# Patient Record
Sex: FEMALE
[De-identification: names, ages, dates, MRNs, and addresses within clinical notes are randomized per-mention and may not be internally consistent; named-entity substitution may affect disease eponyms.]

---

## 2021-05-18 ENCOUNTER — HOSPITAL ENCOUNTER (EMERGENCY)
Dept: HOSPITAL 90 - EDH | Age: 51
Discharge: HOME | End: 2021-05-18
Payer: MEDICAID

## 2021-05-18 DIAGNOSIS — F43.20: ICD-10-CM

## 2021-05-18 DIAGNOSIS — F41.9: ICD-10-CM

## 2021-05-18 DIAGNOSIS — F32.9: ICD-10-CM

## 2021-05-18 DIAGNOSIS — Z88.6: ICD-10-CM

## 2021-05-18 DIAGNOSIS — E11.9: ICD-10-CM

## 2021-05-18 DIAGNOSIS — R07.89: Primary | ICD-10-CM

## 2021-05-18 DIAGNOSIS — I10: ICD-10-CM

## 2021-05-18 DIAGNOSIS — Z88.1: ICD-10-CM

## 2021-05-18 LAB
ALBUMIN SERPL-MCNC: 3.3 G/DL (ref 3.5–5)
ALT SERPL-CCNC: 15 U/L (ref 12–78)
AST SERPL-CCNC: 31 U/L (ref 10–37)
BASOPHILS NFR BLD AUTO: 0.4 % (ref 0–5)
BILIRUB SERPL-MCNC: 0.4 MG/DL (ref 0.2–1)
BUN SERPL-MCNC: 12 MG/DL (ref 7–18)
CHLORIDE SERPL-SCNC: 106 MMOL/L (ref 101–111)
CO2 SERPL-SCNC: 26 MMOL/L (ref 21–32)
CREAT SERPL-MCNC: 0.8 MG/DL (ref 0.5–1.5)
EOSINOPHIL NFR BLD AUTO: 1.7 % (ref 0–8)
ERYTHROCYTE [DISTWIDTH] IN BLOOD BY AUTOMATED COUNT: 12.7 % (ref 11–15.5)
GFR SERPL CREATININE-BSD FRML MDRD: 80 ML/MIN (ref 60–?)
GLUCOSE SERPL-MCNC: 133 MG/DL (ref 70–105)
HCT VFR BLD AUTO: 37.1 % (ref 36–48)
LYMPHOCYTES NFR SPEC AUTO: 30.9 % (ref 21–51)
MCH RBC QN AUTO: 29.2 PG (ref 27–33)
MCHC RBC AUTO-ENTMCNC: 32.3 G/DL (ref 32–36)
MCV RBC AUTO: 90.3 FL (ref 79–99)
MONOCYTES NFR BLD AUTO: 6.2 % (ref 3–13)
NEUTROPHILS NFR BLD AUTO: 60.5 % (ref 40–77)
NRBC BLD MANUAL-RTO: 0 % (ref 0–0.19)
PLATELET # BLD AUTO: 278 K/UL (ref 130–400)
POTASSIUM SERPL-SCNC: 5.2 MMOL/L (ref 3.5–5.1)
PROT SERPL-MCNC: 7.7 G/DL (ref 6–8.3)
RBC # BLD AUTO: 4.11 MIL/UL (ref 4–5.5)
SODIUM SERPL-SCNC: 142 MMOL/L (ref 136–145)
WBC # BLD AUTO: 7.7 K/UL (ref 4.8–10.8)

## 2021-05-18 PROCEDURE — 84484 ASSAY OF TROPONIN QUANT: CPT

## 2021-05-18 PROCEDURE — 71045 X-RAY EXAM CHEST 1 VIEW: CPT

## 2021-05-18 PROCEDURE — 96374 THER/PROPH/DIAG INJ IV PUSH: CPT

## 2021-05-18 PROCEDURE — 99285 EMERGENCY DEPT VISIT HI MDM: CPT

## 2021-05-18 PROCEDURE — 93005 ELECTROCARDIOGRAM TRACING: CPT

## 2021-05-18 PROCEDURE — 96361 HYDRATE IV INFUSION ADD-ON: CPT

## 2021-05-18 PROCEDURE — 80053 COMPREHEN METABOLIC PANEL: CPT

## 2021-05-18 PROCEDURE — 85025 COMPLETE CBC W/AUTO DIFF WBC: CPT

## 2021-05-18 PROCEDURE — 96375 TX/PRO/DX INJ NEW DRUG ADDON: CPT

## 2021-05-18 PROCEDURE — 36415 COLL VENOUS BLD VENIPUNCTURE: CPT

## 2021-09-28 ENCOUNTER — HOSPITAL ENCOUNTER (OUTPATIENT)
Dept: HOSPITAL 90 - RAH | Age: 51
Discharge: HOME | End: 2021-09-28
Attending: OTOLARYNGOLOGY
Payer: MEDICAID

## 2021-09-28 DIAGNOSIS — H90.42: Primary | ICD-10-CM

## 2021-09-28 PROCEDURE — 70553 MRI BRAIN STEM W/O & W/DYE: CPT

## 2022-03-06 ENCOUNTER — HOSPITAL ENCOUNTER (EMERGENCY)
Dept: HOSPITAL 90 - EDH | Age: 52
Discharge: HOME | End: 2022-03-06
Payer: MEDICAID

## 2022-03-06 VITALS — WEIGHT: 138.01 LBS | HEIGHT: 59 IN | BODY MASS INDEX: 27.82 KG/M2

## 2022-03-06 VITALS — DIASTOLIC BLOOD PRESSURE: 67 MMHG | SYSTOLIC BLOOD PRESSURE: 112 MMHG

## 2022-03-06 DIAGNOSIS — R07.89: ICD-10-CM

## 2022-03-06 DIAGNOSIS — Z98.890: ICD-10-CM

## 2022-03-06 DIAGNOSIS — Z88.1: ICD-10-CM

## 2022-03-06 DIAGNOSIS — E11.9: ICD-10-CM

## 2022-03-06 DIAGNOSIS — I10: ICD-10-CM

## 2022-03-06 DIAGNOSIS — Z79.899: ICD-10-CM

## 2022-03-06 DIAGNOSIS — F43.9: Primary | ICD-10-CM

## 2022-03-06 DIAGNOSIS — Z88.6: ICD-10-CM

## 2022-03-06 LAB
ALBUMIN SERPL-MCNC: 3.6 G/DL (ref 3.5–5)
ALT SERPL-CCNC: 23 U/L (ref 12–78)
AST SERPL-CCNC: 16 U/L (ref 10–37)
BASOPHILS NFR BLD AUTO: 0.3 % (ref 0–5)
BILIRUB SERPL-MCNC: 0.4 MG/DL (ref 0.2–1)
BUN SERPL-MCNC: 19 MG/DL (ref 7–18)
CHLORIDE SERPL-SCNC: 104 MMOL/L (ref 101–111)
CO2 SERPL-SCNC: 28 MMOL/L (ref 21–32)
CREAT SERPL-MCNC: 0.8 MG/DL (ref 0.5–1.5)
EOSINOPHIL NFR BLD AUTO: 1 % (ref 0–8)
ERYTHROCYTE [DISTWIDTH] IN BLOOD BY AUTOMATED COUNT: 12.7 % (ref 11–15.5)
GFR SERPL CREATININE-BSD FRML MDRD: 80 ML/MIN (ref 60–?)
GLUCOSE SERPL-MCNC: 115 MG/DL (ref 70–105)
HCT VFR BLD AUTO: 38.1 % (ref 36–48)
LYMPHOCYTES NFR SPEC AUTO: 40.5 % (ref 21–51)
MCH RBC QN AUTO: 29.1 PG (ref 27–33)
MCHC RBC AUTO-ENTMCNC: 32 G/DL (ref 32–36)
MCV RBC AUTO: 90.9 FL (ref 79–99)
MONOCYTES NFR BLD AUTO: 7.1 % (ref 3–13)
NEUTROPHILS NFR BLD AUTO: 50.9 % (ref 40–77)
NRBC BLD MANUAL-RTO: 0 % (ref 0–0.19)
PLATELET # BLD AUTO: 319 K/UL (ref 130–400)
POTASSIUM SERPL-SCNC: 3.9 MMOL/L (ref 3.5–5.1)
PROT SERPL-MCNC: 7.7 G/DL (ref 6–8.3)
RBC # BLD AUTO: 4.19 MIL/UL (ref 4–5.5)
SODIUM SERPL-SCNC: 140 MMOL/L (ref 136–145)
WBC # BLD AUTO: 8.8 K/UL (ref 4.8–10.8)

## 2022-03-06 PROCEDURE — 84484 ASSAY OF TROPONIN QUANT: CPT

## 2022-03-06 PROCEDURE — 93005 ELECTROCARDIOGRAM TRACING: CPT

## 2022-03-06 PROCEDURE — 36415 COLL VENOUS BLD VENIPUNCTURE: CPT

## 2022-03-06 PROCEDURE — 85025 COMPLETE CBC W/AUTO DIFF WBC: CPT

## 2022-03-06 PROCEDURE — 83690 ASSAY OF LIPASE: CPT

## 2022-03-06 PROCEDURE — 71045 X-RAY EXAM CHEST 1 VIEW: CPT

## 2022-03-06 PROCEDURE — 80053 COMPREHEN METABOLIC PANEL: CPT

## 2022-04-25 ENCOUNTER — HOSPITAL ENCOUNTER (EMERGENCY)
Dept: HOSPITAL 90 - EDH | Age: 52
Discharge: HOME | End: 2022-04-25
Payer: MEDICAID

## 2022-04-25 VITALS — DIASTOLIC BLOOD PRESSURE: 86 MMHG | SYSTOLIC BLOOD PRESSURE: 134 MMHG

## 2022-04-25 DIAGNOSIS — Z79.899: ICD-10-CM

## 2022-04-25 DIAGNOSIS — R07.89: Primary | ICD-10-CM

## 2022-04-25 DIAGNOSIS — Z88.6: ICD-10-CM

## 2022-04-25 DIAGNOSIS — I10: ICD-10-CM

## 2022-04-25 DIAGNOSIS — Z98.890: ICD-10-CM

## 2022-04-25 DIAGNOSIS — E11.9: ICD-10-CM

## 2022-04-25 DIAGNOSIS — K21.9: ICD-10-CM

## 2022-04-25 DIAGNOSIS — Z88.1: ICD-10-CM

## 2022-04-25 LAB
ALBUMIN SERPL-MCNC: 3.6 G/DL (ref 3.5–5)
ALT SERPL-CCNC: 19 U/L (ref 12–78)
AMPHETAMINES UR QL SCN: NEGATIVE
AST SERPL-CCNC: 17 U/L (ref 10–37)
BARBITURATES UR QL SCN: NEGATIVE
BASOPHILS NFR BLD AUTO: 0.4 % (ref 0–5)
BENZODIAZ UR QL SCN: NEGATIVE
BILIRUB SERPL-MCNC: 0.3 MG/DL (ref 0.2–1)
BNP SERPL-MCNC: 28 PG/ML (ref 0–100)
BUN SERPL-MCNC: 19 MG/DL (ref 7–18)
BZE UR QL SCN: NEGATIVE
CANNABINOIDS UR QL SCN: NEGATIVE
CHLORIDE SERPL-SCNC: 104 MMOL/L (ref 101–111)
CO2 SERPL-SCNC: 26 MMOL/L (ref 21–32)
CREAT SERPL-MCNC: 0.9 MG/DL (ref 0.5–1.5)
EOSINOPHIL NFR BLD AUTO: 0.9 % (ref 0–8)
ERYTHROCYTE [DISTWIDTH] IN BLOOD BY AUTOMATED COUNT: 13.6 % (ref 11–15.5)
GFR SERPL CREATININE-BSD FRML MDRD: 70 ML/MIN (ref 60–?)
GLUCOSE SERPL-MCNC: 106 MG/DL (ref 70–105)
HCT VFR BLD AUTO: 36.9 % (ref 36–48)
INR PPP: 0.98 (ref 0.85–1.15)
LYMPHOCYTES NFR SPEC AUTO: 21.9 % (ref 21–51)
MCH RBC QN AUTO: 29.5 PG (ref 27–33)
MCHC RBC AUTO-ENTMCNC: 32.8 G/DL (ref 32–36)
MCV RBC AUTO: 90 FL (ref 79–99)
MONOCYTES NFR BLD AUTO: 6.6 % (ref 3–13)
NEUTROPHILS NFR BLD AUTO: 70 % (ref 40–77)
NRBC BLD MANUAL-RTO: 0 % (ref 0–0.19)
OPIATES UR QL SCN: NEGATIVE
PCP UR QL SCN: NEGATIVE
PLATELET # BLD AUTO: 256 K/UL (ref 130–400)
POTASSIUM SERPL-SCNC: 4 MMOL/L (ref 3.5–5.1)
PROT SERPL-MCNC: 7.7 G/DL (ref 6–8.3)
PROTHROMBIN TIME: 10.7 SEC (ref 9.6–11.6)
RBC # BLD AUTO: 4.1 MIL/UL (ref 4–5.5)
SODIUM SERPL-SCNC: 140 MMOL/L (ref 136–145)
WBC # BLD AUTO: 8.5 K/UL (ref 4.8–10.8)

## 2022-04-25 PROCEDURE — 36415 COLL VENOUS BLD VENIPUNCTURE: CPT

## 2022-04-25 PROCEDURE — 80053 COMPREHEN METABOLIC PANEL: CPT

## 2022-04-25 PROCEDURE — 71045 X-RAY EXAM CHEST 1 VIEW: CPT

## 2022-04-25 PROCEDURE — 80305 DRUG TEST PRSMV DIR OPT OBS: CPT

## 2022-04-25 PROCEDURE — 84484 ASSAY OF TROPONIN QUANT: CPT

## 2022-04-25 PROCEDURE — 85025 COMPLETE CBC W/AUTO DIFF WBC: CPT

## 2022-04-25 PROCEDURE — 93005 ELECTROCARDIOGRAM TRACING: CPT

## 2022-04-25 PROCEDURE — 83880 ASSAY OF NATRIURETIC PEPTIDE: CPT

## 2022-04-25 PROCEDURE — 85610 PROTHROMBIN TIME: CPT

## 2023-08-27 ENCOUNTER — HOSPITAL ENCOUNTER (EMERGENCY)
Dept: HOSPITAL 90 - EDH | Age: 53
LOS: 1 days | Discharge: HOME | End: 2023-08-28
Payer: MEDICAID

## 2023-08-27 VITALS — WEIGHT: 145.95 LBS | HEIGHT: 59 IN | BODY MASS INDEX: 29.42 KG/M2

## 2023-08-27 DIAGNOSIS — E11.9: ICD-10-CM

## 2023-08-27 DIAGNOSIS — I10: ICD-10-CM

## 2023-08-27 DIAGNOSIS — E78.00: ICD-10-CM

## 2023-08-27 DIAGNOSIS — Z91.148: ICD-10-CM

## 2023-08-27 DIAGNOSIS — Z88.1: ICD-10-CM

## 2023-08-27 DIAGNOSIS — R07.89: Primary | ICD-10-CM

## 2023-08-27 DIAGNOSIS — Z88.6: ICD-10-CM

## 2023-08-27 PROCEDURE — 85025 COMPLETE CBC W/AUTO DIFF WBC: CPT

## 2023-08-27 PROCEDURE — 93005 ELECTROCARDIOGRAM TRACING: CPT

## 2023-08-27 PROCEDURE — 81001 URINALYSIS AUTO W/SCOPE: CPT

## 2023-08-27 PROCEDURE — 83880 ASSAY OF NATRIURETIC PEPTIDE: CPT

## 2023-08-27 PROCEDURE — 80053 COMPREHEN METABOLIC PANEL: CPT

## 2023-08-27 PROCEDURE — 85730 THROMBOPLASTIN TIME PARTIAL: CPT

## 2023-08-27 PROCEDURE — 99285 EMERGENCY DEPT VISIT HI MDM: CPT

## 2023-08-27 PROCEDURE — 84484 ASSAY OF TROPONIN QUANT: CPT

## 2023-08-27 PROCEDURE — 82550 ASSAY OF CK (CPK): CPT

## 2023-08-27 PROCEDURE — 87088 URINE BACTERIA CULTURE: CPT

## 2023-08-27 PROCEDURE — 36415 COLL VENOUS BLD VENIPUNCTURE: CPT

## 2023-08-27 PROCEDURE — 85378 FIBRIN DEGRADE SEMIQUANT: CPT

## 2023-08-27 PROCEDURE — 71270 CT THORAX DX C-/C+: CPT

## 2023-08-27 PROCEDURE — 85610 PROTHROMBIN TIME: CPT

## 2023-08-27 PROCEDURE — 71045 X-RAY EXAM CHEST 1 VIEW: CPT

## 2023-08-28 VITALS
SYSTOLIC BLOOD PRESSURE: 103 MMHG | OXYGEN SATURATION: 100 % | HEART RATE: 77 BPM | DIASTOLIC BLOOD PRESSURE: 66 MMHG | RESPIRATION RATE: 18 BRPM

## 2023-08-28 LAB
ALBUMIN SERPL-MCNC: 3.6 G/DL (ref 3.5–5)
ALT SERPL-CCNC: 30 U/L (ref 12–78)
APPEARANCE UR: (no result)
APTT PPP: 27.8 SEC (ref 26.3–35.5)
AST SERPL-CCNC: 20 U/L (ref 10–37)
BACTERIA URNS QL MICRO: (no result) /HPF
BASOPHILS # BLD AUTO: 0.04 K/UL (ref 0–0.2)
BASOPHILS NFR BLD AUTO: 0.5 % (ref 0–5)
BILIRUB SERPL-MCNC: 0.2 MG/DL (ref 0.2–1)
BILIRUB UR QL STRIP: NEGATIVE MG/DL
BNP SERPL-MCNC: 9 PG/ML (ref 0–100)
BUN SERPL-MCNC: 18 MG/DL (ref 7–18)
CHLORIDE SERPL-SCNC: 99 MMOL/L (ref 101–111)
CK SERPL-CCNC: 107 U/L (ref 21–232)
CO2 SERPL-SCNC: 30 MMOL/L (ref 21–32)
COLOR UR: (no result)
CREAT SERPL-MCNC: 0.8 MG/DL (ref 0.5–1.5)
DEPRECATED SQUAMOUS URNS QL MICRO: (no result) /HPF (ref 0–2)
EOSINOPHIL # BLD AUTO: 0.13 K/UL (ref 0–0.7)
EOSINOPHIL NFR BLD AUTO: 1.5 % (ref 0–8)
ERYTHROCYTE [DISTWIDTH] IN BLOOD BY AUTOMATED COUNT: 12.2 % (ref 11–15.5)
GFR SERPL CREATININE-BSD FRML MDRD: 88 ML/MIN (ref 90–?)
GLUCOSE SERPL-MCNC: 126 MG/DL (ref 70–105)
GLUCOSE UR STRIP-MCNC: NEGATIVE MG/DL
HCT VFR BLD AUTO: 39.6 % (ref 36–48)
HGB UR QL STRIP: NEGATIVE
IMM GRANULOCYTES # BLD: 0.03 K/UL (ref 0–1)
INR PPP: < 0.93 (ref 0.85–1.15)
KETONES UR STRIP-MCNC: NEGATIVE MG/DL
LEUKOCYTE ESTERASE UR QL STRIP: 250 LEU/UL
LYMPHOCYTES # SPEC AUTO: 3.1 K/UL (ref 1–4.8)
LYMPHOCYTES NFR SPEC AUTO: 35.4 % (ref 21–51)
MCH RBC QN AUTO: 29.3 PG (ref 27–33)
MCHC RBC AUTO-ENTMCNC: 32.6 G/DL (ref 32–36)
MCV RBC AUTO: 89.8 FL (ref 79–99)
MICRO URNS: YES
MONOCYTES # BLD AUTO: 0.6 K/UL (ref 0.1–1)
MONOCYTES NFR BLD AUTO: 6.8 % (ref 3–13)
MUCOUS THREADS URNS QL MICRO: (no result) LPF
NEUTROPHILS # BLD AUTO: 4.8 K/UL (ref 1.8–7.7)
NEUTROPHILS NFR BLD AUTO: 55.5 % (ref 40–77)
NITRITE UR QL STRIP: NEGATIVE
NRBC BLD MANUAL-RTO: 0 % (ref 0–0.19)
PH UR STRIP: 6 [PH] (ref 5–8)
PLATELET # BLD AUTO: 322 K/UL (ref 130–400)
POTASSIUM SERPL-SCNC: 3.8 MMOL/L (ref 3.5–5.1)
PROT SERPL-MCNC: 7.7 G/DL (ref 6–8.3)
PROT UR QL STRIP: NEGATIVE MG/DL
PROTHROMBIN TIME: 10.3 SEC (ref 9.6–11.6)
RBC # BLD AUTO: 4.41 MIL/UL (ref 4–5.5)
RBC #/AREA URNS HPF: (no result) /HPF (ref 0–1)
SODIUM SERPL-SCNC: 134 MMOL/L (ref 136–145)
SP GR UR STRIP: 1.02 (ref 1–1.03)
UROBILINOGEN UR STRIP-MCNC: 0.2 MG/DL (ref 0.2–1)
WBC # BLD AUTO: 8.7 K/UL (ref 4.8–10.8)
WBC #/AREA URNS HPF: (no result) /HPF (ref 0–1)

## 2023-10-12 ENCOUNTER — HOSPITAL ENCOUNTER (EMERGENCY)
Dept: HOSPITAL 90 - EDH | Age: 53
Discharge: HOME | End: 2023-10-12
Payer: MEDICAID

## 2023-10-12 VITALS — DIASTOLIC BLOOD PRESSURE: 68 MMHG | HEART RATE: 90 BPM | RESPIRATION RATE: 18 BRPM | SYSTOLIC BLOOD PRESSURE: 122 MMHG

## 2023-10-12 VITALS — BODY MASS INDEX: 27.48 KG/M2 | WEIGHT: 139.99 LBS | HEIGHT: 60 IN

## 2023-10-12 DIAGNOSIS — F41.9: Primary | ICD-10-CM

## 2023-10-12 PROCEDURE — 99281 EMR DPT VST MAYX REQ PHY/QHP: CPT

## 2024-10-30 ENCOUNTER — HOSPITAL ENCOUNTER (EMERGENCY)
Dept: HOSPITAL 90 - EDH | Age: 54
LOS: 1 days | Discharge: HOME | End: 2024-10-31
Payer: MEDICAID

## 2024-10-30 VITALS — WEIGHT: 160.06 LBS | BODY MASS INDEX: 31.42 KG/M2 | HEIGHT: 60 IN

## 2024-10-30 DIAGNOSIS — Z95.5: ICD-10-CM

## 2024-10-30 DIAGNOSIS — Z88.1: ICD-10-CM

## 2024-10-30 DIAGNOSIS — F43.9: ICD-10-CM

## 2024-10-30 DIAGNOSIS — E87.6: ICD-10-CM

## 2024-10-30 DIAGNOSIS — R07.89: Primary | ICD-10-CM

## 2024-10-30 DIAGNOSIS — E78.00: ICD-10-CM

## 2024-10-30 DIAGNOSIS — E11.9: ICD-10-CM

## 2024-10-30 DIAGNOSIS — Z79.890: ICD-10-CM

## 2024-10-30 DIAGNOSIS — Z88.6: ICD-10-CM

## 2024-10-30 DIAGNOSIS — F41.9: ICD-10-CM

## 2024-10-30 DIAGNOSIS — I10: ICD-10-CM

## 2024-10-30 LAB
BASOPHILS # BLD AUTO: 0.02 K/UL (ref 0–0.2)
BASOPHILS NFR BLD AUTO: 0.2 % (ref 0–5)
BUN SERPL-MCNC: 15 MG/DL (ref 7–18)
CHLORIDE SERPL-SCNC: 100 MMOL/L (ref 101–111)
CK SERPL-CCNC: 223 U/L (ref 21–232)
CO2 SERPL-SCNC: 29 MMOL/L (ref 21–32)
CREAT SERPL-MCNC: 0.9 MG/DL (ref 0.5–1)
EOSINOPHIL # BLD AUTO: 0.01 K/UL (ref 0–0.7)
EOSINOPHIL NFR BLD AUTO: 0.1 % (ref 0–8)
ERYTHROCYTE [DISTWIDTH] IN BLOOD BY AUTOMATED COUNT: 12.6 % (ref 11–15.5)
GFR SERPL CREATININE-BSD FRML MDRD: 76 ML/MIN (ref 90–?)
GLUCOSE SERPL-MCNC: 156 MG/DL (ref 70–105)
HCT VFR BLD AUTO: 39.6 % (ref 36–48)
IMM GRANULOCYTES # BLD: 0.02 K/UL (ref 0–1)
LYMPHOCYTES # SPEC AUTO: 2.5 K/UL (ref 1–4.8)
LYMPHOCYTES NFR SPEC AUTO: 23.3 % (ref 21–51)
MCH RBC QN AUTO: 29 PG (ref 27–33)
MCHC RBC AUTO-ENTMCNC: 32.3 G/DL (ref 32–36)
MCV RBC AUTO: 89.6 FL (ref 79–99)
MONOCYTES # BLD AUTO: 0.6 K/UL (ref 0.1–1)
MONOCYTES NFR BLD AUTO: 5.7 % (ref 3–13)
NEUTROPHILS # BLD AUTO: 7.4 K/UL (ref 1.8–7.7)
NEUTROPHILS NFR BLD AUTO: 70.5 % (ref 40–77)
NRBC BLD MANUAL-RTO: 0 % (ref 0–0.19)
PLATELET # BLD AUTO: 300 K/UL (ref 130–400)
POTASSIUM SERPL-SCNC: 3.3 MMOL/L (ref 3.5–5.1)
RBC # BLD AUTO: 4.42 MIL/UL (ref 4–5.5)
SODIUM SERPL-SCNC: 137 MMOL/L (ref 136–145)
WBC # BLD AUTO: 10.5 K/UL (ref 4.8–10.8)

## 2024-10-30 PROCEDURE — 93005 ELECTROCARDIOGRAM TRACING: CPT

## 2024-10-30 PROCEDURE — 80048 BASIC METABOLIC PNL TOTAL CA: CPT

## 2024-10-30 PROCEDURE — 71045 X-RAY EXAM CHEST 1 VIEW: CPT

## 2024-10-30 PROCEDURE — 36415 COLL VENOUS BLD VENIPUNCTURE: CPT

## 2024-10-30 PROCEDURE — 84484 ASSAY OF TROPONIN QUANT: CPT

## 2024-10-30 PROCEDURE — 85025 COMPLETE CBC W/AUTO DIFF WBC: CPT

## 2024-10-30 PROCEDURE — 82550 ASSAY OF CK (CPK): CPT

## 2024-10-30 PROCEDURE — 83880 ASSAY OF NATRIURETIC PEPTIDE: CPT

## 2024-10-31 VITALS
TEMPERATURE: 97.9 F | OXYGEN SATURATION: 97 % | SYSTOLIC BLOOD PRESSURE: 126 MMHG | HEART RATE: 95 BPM | DIASTOLIC BLOOD PRESSURE: 77 MMHG | RESPIRATION RATE: 18 BRPM

## 2024-10-31 LAB — BNP SERPL-MCNC: 14 PG/ML (ref 0–100)

## 2024-10-31 NOTE — ERN
ED Note


History of Present Illness


Stated Complaint:  CHEST PAIN


Chief Complaint:  Chest Pain


Time Seen by MD:  23:01


Time Seen by Midlevel:  23:01


Dictation:


The patient is a 54-year-old female with a history of diabetes, hypertension, 

cholecystectomy who presents to the emergency department with complaints of mid 

chest pain onset1 hour prior to arrival after having an argument.  Patient 

denies any sweating, shortness of breath, nausea or vomiting.  Patient reports 

pain is worse with movement.


Allergies:  


Coded Allergies:  


     acetaminophen (Unverified  Allergy, Unknown, 9/24/23)


     ciprofloxacin (Unverified  Allergy, Unknown, 5/18/21)


     ibuprofen (Unverified  Allergy, Unknown, 5/18/21)


Home Meds


Active Scripts


Cefdinir (Cefdinir) 300 Mg Capsule, 300 MG PO BID for 5 Days, #10 CAP


   Prov:KIERA EVANS NP         2/20/24


Reported Medications


Hydroxyzine HCl (Hydroxyzine HCl) 25 Mg Tablet, 25 MG PO BID, TAB


   2/18/24


Levothyroxine Sodium (Synthroid  50 Mcg Tab) 50 Mcg Tablet, 50 MCG PO DAILY, #1 

TAB


   2/18/24





Past Medical History


Past Medical History:  Anxiety, Diabetes-Type II, High Cholesterol, Heart 

Disease, Hypertension, Hyperthyroid


Additional Past Medical Hx:  GASTRITIS


Surgical History:  None


Surgical History Other:  CARDIAC STENTS


Family History:  DM, HTN


Social History:  Negative, Lives with family


Pregnancy History:  Not Applicable


RN Note Reviewed/Agreed w/PFSH:  Yes





Review of System


Dictation


Constitutional: Negative for fever,chills, and weight loss


Eyes: Negative for injury, pain,redness, and discharge


ENT: Negative for injury,pain or swelling


Cardiovascular: Negative for  palpitations, and edema.  Positive for chest pain


Respiratory: Negative for shortness of breath, cough, and wheezing, 


Abdomen/GI: Negative for abdominal pain, nausea, vomiting, diarrhea, and 

constipation


Back: Negative for injury and pain


: Negative for injury, bleeding and discharge


MS/Extremity: Negative for injury and deformity


Skin: Negative for rash, and discoloration


Neuro: Negative for headache, weakness, numbness, tingling, and seizure 


Psych: Negative for suicide ideation, homicidal ideation, and hallucinations





Initial Vital Sign


VS





                                   Vital Signs








  Date Time  Temp Pulse Resp B/P (MAP) Pulse Ox O2 Delivery O2 Flow Rate FiO2


 


10/30/24 22:49 98.2 120 20 127/77 97 Room Air 0 


 


10/30/24 23:10        21











Physical Exam


Dictation


Vital Signs reviewed 


General Appearance: Alert, oriented x 3, no acute distress, well developed, 

nourished. 


Head and Face: non-traumatic.


Eyes: PERRL, pink conjunctivas, eyelid no trauma, anterior chamber with arcus 

senilis. 


Ears: Pinnas intact and no signs of trauma or erythema ear canals clear and no 

discharge TM no erythema 


Nose: No discharge, no bleeding. 


Oropharynx: Mouth normal, tongue pink.


pharynx clear,no erythema, tonsils no exudates, no abscesses noted,  mucous 

membrane moist 


Neck: Supple, non-tender, no thyromegaly, no masses, no JVD, no bruits 


Breast:Deferred 


Chest: tenderness, no crepitus, no paradoxical movement, no retractions 


Lungs:Clear, well-ventilated, symmetric, no rales, no wheezing, no rhonchi, no 

stridor, good breath sounds bilaterally 


Heart: Regular rate, regular rhythm, no murmur, no gallops 


Vascular: no peripheral edema, 


Abdomen: Soft, positive bowel sounds, nondistended, no guarding, 


nontender, no rebound, no masses no hepatomegaly, no splenomegaly, no Isidro's 

sign, no hernias.


Rectal: Deferred


Genital: Deferred


Neurological: Normal speech,  motor function intact, sensory function intact 


Musculoskeletal: Neck nontender, full range of motion, back nontender, full 

range of motion,


Extremities: nontender, full range of motion 


Skin: Color pink, dry, no turgor, no rash, no lacerations, no abrasions, no 

contusions.


Lymphatic: Deferred





Results (Laboratory/Radiology)


Laboratory/Radiology





Laboratory Tests








Test


 10/30/24


23:10 10/30/24


23:38 10/31/24


00:45


 


White Blood Count


 10.5 K/uL


(4.8-10.8) 


 





 


Red Blood Count


 4.42 MIL/uL


(4.00-5.50) 


 





 


Hemoglobin


 12.8 g/dL


(12.0-16.0) 


 





 


Hematocrit


 39.6 % (36-48)


 


 





 


Mean Corpuscular Volume


 89.6 fL


(79-99) 


 





 


Mean Corpuscular Hemoglobin


 29.0 pg


(27.0-33.0) 


 





 


Mean Corpuscular Hemoglobin


Concent 32.3 g/dL


(32.0-36.0) 


 





 


Red Cell Distribution Width


 12.6 %


(11.0-15.5) 


 





 


Platelet Count


 300 K/uL


(130-400) 


 





 


Mean Platelet Volume


 10.5 fL


(7.5-10.5) 


 





 


Immature Granulocyte % (Auto) 0.2 % (0-1)    


 


Neutrophils (%) (Auto)


 70.5 %


(40.0-77.0) 


 





 


Lymphocytes (%) (Auto)


 23.3 %


(21.0-51.0) 


 





 


Monocytes (%) (Auto)


 5.7 %


(3.0-13.0) 


 





 


Eosinophils (%) (Auto)


 0.1 %


(0.0-8.0) 


 





 


Basophils (%) (Auto)


 0.2 %


(0.0-5.0) 


 





 


Neutrophils # (Auto)


 7.4 K/uL


(1.8-7.7) 


 





 


Lymphocytes # (Auto)


 2.5 K/uL


(1.0-4.8) 


 





 


Monocytes # (Auto)


 0.6 K/uL


(0.1-1.0) 


 





 


Eosinophils # (Auto)


 0.01 K/uL


(0.00-0.70) 


 





 


Basophils # (Auto)


 0.02 K/uL


(0.00-0.20) 


 





 


Absolute Immature Granulocyte


(auto 0.02 K/uL


(0-1) 


 





 


Nucleated Red Blood Cells


 0.0 %


(0.0-0.19) 


 





 


Sodium Level


 137 mmol/L


(136-145) 


 





 


Potassium Level


 3.3 mmol/L


(3.5-5.1)  L 


 





 


Chloride Level


 100 mmol/L


(101-111)  L 


 





 


Carbon Dioxide Level


 29 mmol/L


(21-32) 


 





 


Blood Urea Nitrogen


 15 mg/dL


(7-18) 


 





 


Creatinine


 0.9 mg/dL


(0.5-1.0) 


 





 


Glomerular Filtration Rate


Calc 76 mL/min


(>90) 


 





 


Random Glucose


 156 mg/dL


()  H 


 





 


Total Calcium


 9.5 mg/dL


(8.5-10.1) 


 





 


Total Creatine Kinase


 223 U/L


()  # 


 





 


Troponin I High Sensitivity


 22 ng/L (4-50)


 


 20 ng/L (4-50)





 


B-Type Natriuretic Peptide


 14 pg/mL


(0-100) 


 





 


Troponin I


 


 < 0.05 ng/mL


(0.00-0.05) 











Labs Reviewed?:  Yes


EKG:  (+) rhythm (Sinus tachycardia), (+) NE (146), (+) QRS


EKG Comment:


EKG 10/30/2024 2303 ventricular rate 108, regular rate and rhythm, sinus 

tachycardia, no STEMI.





ED Course


ED Course





Orders








Procedure Category Date Status





  Time 


 


Vital Signs Per CPOE 10/30/24 Transmitted





Routine  22:55 


 


B-Type Natriuretic LAB 10/30/24 Complete





Peptide  22:55 


 


Chest 1vw RAD 10/30/24 Taken





  22:55 


 


12 Lead Ekg Tracing- EKG 10/30/24 Logged





Technical  22:55 


 


Oxygen By Nc/Pulse Ox CPOE 10/30/24 Transmitted





  22:55 


 


Maintain Iv CPOE 10/30/24 Transmitted





  22:55 


 


Iv Insertion CPOE 10/30/24 Transmitted





  22:55 


 


Cardiac Monitoring CPOE 10/30/24 Transmitted





  22:55 


 


Pulse Oximetry With CPOE 10/30/24 Transmitted





Vs And Prn  22:55 


 


Cbc With Differential LAB 10/30/24 Complete





  22:55 


 


Activity: Br W/Brp CPOE 10/30/24 Transmitted





With Assist  22:55 


 


Creatine Kinase, Total LAB 10/30/24 Complete





  22:55 


 


Troponin I High LAB 10/30/24 Complete





Sensitivity  22:55 


 


Urinalysis Profile LAB 10/30/24 Logged





  22:55 


 


Troponin Poc Order LAB 10/30/24 Complete





Only  22:55 


 


Basic Metabolic Panel LAB 10/30/24 Complete





  22:55 


 


Morphine 4mg Syg PHA 10/30/24 Complete





 (Morphine 4mg Syg)  23:30 


 


Ondansetron 4mg Inj PHA 10/30/24 Complete





 (Zofran 4mg Inj)  23:30 


 


Troponin I High LAB 10/31/24 Complete





Sensitivity  00:23 








Current Medications








 Medications


  (Trade)  Dose


 Ordered  Sig/Kayode


 Route


 PRN Reason  Start Time


 Stop Time Status Last Admin


Dose Admin


 


 Morphine Sulfate


  (morPHINE 4MG


 SYG)  4 mg  ONCE  ONCE


 IVP


   10/30/24 23:30


 10/30/24 23:31 DC  





 


 Ondansetron HCl


  (zoFRAN 4MG INJ)  4 mg  ONCE  ONCE


 IVP


   10/30/24 23:30


 10/30/24 23:31 DC  











Vital Signs








  Date Time  Temp Pulse Resp B/P (MAP) Pulse Ox O2 Delivery O2 Flow Rate FiO2


 


10/31/24 01:00 97.9 95 18 128/77 97 Room Air* 0 21


 


10/31/24 00:04 97.9 100 18 121/80 97 Room Air* 0 21


 


10/30/24 23:10 97.9 109 19 126/87 97 Room Air* 0 21


 


10/30/24 22:49 98.2 120 20 127/77 97 Room Air 0 

















HEART Score Response (Comments) Value


 


History: Low suspicion (0) 0


 


EKG: Normal 0


 


Age:                                    45-65yrs (+1) 1


 


Risk Factors:                           1-2 risk factors (+1) 1


 


Initial Troponin: Normal limit (0) 0


 


Total  2











Medical Decision Making


MDM


The patient is a 54-year-old female with a history of diabetes, hypertension, 

cholecystectomy who presents to the emergency department with complaints of mid 

chest pain onset1 hour prior to arrival after having an argument.  Patient 

denies any sweating, shortness of breath, nausea or vomiting.  Patient reports 

pain is worse with movement.


CBC showed no leukocytosis, no anemia, chemistry showed mild hypokalemia, 

hypochloremia, negative troponin x2. patient reports no longer having any chest 

pain.  Patient with low chest pain risk.  Tenderness to palpation chest.  Likely

cardiac related.  Labs and imaging discussed with the patient who agrees to 

follow up with PCP.


Differential diagnosis:  ACS, pneumonia, pneumothorax, anxiety, costochondritis





Need for hospitalization: Patient does not meet criteria for hospitalization.





There are no social concerns with this patient.





DX & DISP


Disposition:  Discharge


Departure


Impression:  


   Primary Impression:  Chest wall pain


   Additional Impressions:  Stress reaction, Hypokalemia


Condition:  Stable





Additional Instructions:  





FOLLOW-UP WITH PRIMARY CARE PROVIDER IN 1 TO 2 DAYS.  TAKE MEDICATIONS AS 

DIRECTED HERE IN THE EMERGENCY ROOM.  OKAY TO CONTINUE HOME MEDICATIONS UNLESS 

OTHERWISE DISCUSSED DURING YOUR VISIT IN THE EMERGENCY ROOM TODAY.  RETURN TO 

YOUR NEAREST EMERGENCY ROOM IF SYMPTOMS WORSEN OR IF THERE IS NO IMPROVEMENT.  

CALL 911 IF YOU NEED IMMEDIATE ASSISTANCE.  TAKE TYLENOL OR MOTRIN 

OVER-THE-COUNTER AS NEEDED AND IF NO CONTRAINDICATIONS ARE PRESENT.  INCREASE 

ORAL HYDRATION.  A WOUND CULTURE OR URINE CULTURE WAS ORDERED HERE IN THE 

EMERGENCY ROOM DEPARTMENT PLEASE FOLLOW-UP WITH PRIMARY CARE PROVIDER AND ADVISE

THEM TO GET REPEAT PORTS FROM OUR FACILITY.  IF YOU HAD ANY ACE WRAP/SPLINTS TH

AT WERE APPLIED HERE, PLEASE DO NOT REMOVE THEM UNTIL YOU SEE YOUR PRIMARY CARE 

OR SPECIALTY.


Referrals:  


REYES,RAUL MD (PCP)


Time of Disposition:  01:25


I have reviewed the case, and I agree with, Diagnosis and Plan











FRANCESCA VIZCARRA MD               Oct 31, 2024 00:39

## 2024-10-31 NOTE — HMCIMG
CHEST 1VW



REASON: CHEST PAIN



COMPARISON: 9/24/2023



FINDINGS:  

Single view of the chest was obtained. Lungs are clear. Heart size is

normal. There is no pulmonary vascular congestion. Mediastinum and bony

thorax appear unremarkable.



IMPRESSION:

1. Normal single view chest x-ray.

## 2025-02-25 ENCOUNTER — HOSPITAL ENCOUNTER (EMERGENCY)
Dept: HOSPITAL 90 - EDH | Age: 55
Discharge: LEFT BEFORE DISCHARGE | End: 2025-02-25
Payer: COMMERCIAL

## 2025-02-25 VITALS — WEIGHT: 139.99 LBS | BODY MASS INDEX: 28.22 KG/M2 | HEIGHT: 59 IN

## 2025-02-25 VITALS
SYSTOLIC BLOOD PRESSURE: 101 MMHG | DIASTOLIC BLOOD PRESSURE: 62 MMHG | TEMPERATURE: 98.7 F | RESPIRATION RATE: 18 BRPM | HEART RATE: 80 BPM

## 2025-02-25 VITALS — OXYGEN SATURATION: 100 %

## 2025-02-25 DIAGNOSIS — E11.9: ICD-10-CM

## 2025-02-25 DIAGNOSIS — F41.9: ICD-10-CM

## 2025-02-25 DIAGNOSIS — I10: ICD-10-CM

## 2025-02-25 DIAGNOSIS — Z88.1: ICD-10-CM

## 2025-02-25 DIAGNOSIS — Z79.890: ICD-10-CM

## 2025-02-25 DIAGNOSIS — E78.00: ICD-10-CM

## 2025-02-25 DIAGNOSIS — Z95.5: ICD-10-CM

## 2025-02-25 DIAGNOSIS — R10.9: Primary | ICD-10-CM

## 2025-02-25 DIAGNOSIS — Z88.6: ICD-10-CM

## 2025-02-25 DIAGNOSIS — Z90.49: ICD-10-CM

## 2025-02-25 LAB
ALBUMIN SERPL-MCNC: 3.8 G/DL (ref 3.5–5)
ALT SERPL-CCNC: 31 U/L (ref 12–78)
AMPHETAMINES UR QL SCN: NEGATIVE
APPEARANCE UR: CLEAR
APTT PPP: 30.1 SEC (ref 26.3–35.5)
AST SERPL-CCNC: 37 U/L (ref 10–37)
BARBITURATES UR QL SCN: NEGATIVE
BASOPHILS # BLD AUTO: 0.03 K/UL (ref 0–0.2)
BASOPHILS NFR BLD AUTO: 0.3 % (ref 0–5)
BENZODIAZ UR QL SCN: NEGATIVE
BILIRUB SERPL-MCNC: 0.5 MG/DL (ref 0.2–1)
BILIRUB UR QL STRIP: NEGATIVE MG/DL
BNP SERPL-MCNC: 10 PG/ML (ref 0–100)
BUN SERPL-MCNC: 19 MG/DL (ref 7–18)
BZE UR QL SCN: NEGATIVE
CANNABINOIDS UR QL SCN: NEGATIVE
CHLORIDE SERPL-SCNC: 105 MMOL/L (ref 101–111)
CK SERPL-CCNC: 1020 U/L (ref 21–232)
CO2 SERPL-SCNC: 26 MMOL/L (ref 21–32)
COLOR UR: (no result)
CREAT SERPL-MCNC: 0.9 MG/DL (ref 0.5–1)
EOSINOPHIL # BLD AUTO: 0.07 K/UL (ref 0–0.7)
EOSINOPHIL NFR BLD AUTO: 0.7 % (ref 0–8)
ERYTHROCYTE [DISTWIDTH] IN BLOOD BY AUTOMATED COUNT: 13.8 % (ref 11–15.5)
GFR SERPL CREATININE-BSD FRML MDRD: 76 ML/MIN (ref 90–?)
GLUCOSE SERPL-MCNC: 135 MG/DL (ref 70–105)
GLUCOSE UR STRIP-MCNC: NEGATIVE MG/DL
HCT VFR BLD AUTO: 39.5 % (ref 36–48)
HGB UR QL STRIP: NEGATIVE
IMM GRANULOCYTES # BLD: 0.02 K/UL (ref 0–1)
INR PPP: 1.04 (ref 0.85–1.15)
KETONES UR STRIP-MCNC: 5 MG/DL
LEUKOCYTE ESTERASE UR QL STRIP: NEGATIVE LEU/UL
LYMPHOCYTES # SPEC AUTO: 1.6 K/UL (ref 1–4.8)
LYMPHOCYTES NFR SPEC AUTO: 15.5 % (ref 21–51)
MAGNESIUM SERPL-MCNC: 2.1 MG/DL (ref 1.8–2.4)
MCH RBC QN AUTO: 29 PG (ref 27–33)
MCHC RBC AUTO-ENTMCNC: 31.9 G/DL (ref 32–36)
MCV RBC AUTO: 90.8 FL (ref 79–99)
MICRO URNS: NO
MONOCYTES # BLD AUTO: 0.4 K/UL (ref 0.1–1)
MONOCYTES NFR BLD AUTO: 4.1 % (ref 3–13)
NEUTROPHILS # BLD AUTO: 8.3 K/UL (ref 1.8–7.7)
NEUTROPHILS NFR BLD AUTO: 79.2 % (ref 40–77)
NITRITE UR QL STRIP: NEGATIVE
NRBC BLD MANUAL-RTO: 0 % (ref 0–0.19)
OPIATES UR QL SCN: NEGATIVE
PCP UR QL SCN: NEGATIVE
PH UR STRIP: 6 [PH] (ref 5–8)
PLATELET # BLD AUTO: 267 K/UL (ref 130–400)
POTASSIUM SERPL-SCNC: 4.1 MMOL/L (ref 3.5–5.1)
PROT SERPL-MCNC: 7.9 G/DL (ref 6–8.3)
PROT UR QL STRIP: NEGATIVE MG/DL
PROTHROMBIN TIME: 11 SEC (ref 9.6–11.6)
RBC # BLD AUTO: 4.35 MIL/UL (ref 4–5.5)
SODIUM SERPL-SCNC: 140 MMOL/L (ref 136–145)
SP GR UR STRIP: 1.02 (ref 1–1.03)
UROBILINOGEN UR STRIP-MCNC: 0.2 MG/DL (ref 0.2–1)
WBC # BLD AUTO: 10.5 K/UL (ref 4.8–10.8)

## 2025-02-25 PROCEDURE — 81003 URINALYSIS AUTO W/O SCOPE: CPT

## 2025-02-25 PROCEDURE — 71045 X-RAY EXAM CHEST 1 VIEW: CPT

## 2025-02-25 PROCEDURE — 80053 COMPREHEN METABOLIC PANEL: CPT

## 2025-02-25 PROCEDURE — 85730 THROMBOPLASTIN TIME PARTIAL: CPT

## 2025-02-25 PROCEDURE — 36415 COLL VENOUS BLD VENIPUNCTURE: CPT

## 2025-02-25 PROCEDURE — 82550 ASSAY OF CK (CPK): CPT

## 2025-02-25 PROCEDURE — 83880 ASSAY OF NATRIURETIC PEPTIDE: CPT

## 2025-02-25 PROCEDURE — 85025 COMPLETE CBC W/AUTO DIFF WBC: CPT

## 2025-02-25 PROCEDURE — 83735 ASSAY OF MAGNESIUM: CPT

## 2025-02-25 PROCEDURE — 93005 ELECTROCARDIOGRAM TRACING: CPT

## 2025-02-25 PROCEDURE — 99285 EMERGENCY DEPT VISIT HI MDM: CPT

## 2025-02-25 PROCEDURE — 85610 PROTHROMBIN TIME: CPT

## 2025-02-25 PROCEDURE — 80305 DRUG TEST PRSMV DIR OPT OBS: CPT

## 2025-02-25 PROCEDURE — 84484 ASSAY OF TROPONIN QUANT: CPT

## 2025-02-25 RX ADMIN — SODIUM CHLORIDE ONE MLS/HR: 0.9 INJECTION, SOLUTION INTRAVENOUS at 05:11

## 2025-02-25 NOTE — NUR
PATIENT REFUSED TO HAVE CT ABDO, 

OPTED FOR AMA- DR MARIA AWARE, WILL SEE PRIMARY THIS AM

SECURED AMA CONSENT

## 2025-02-25 NOTE — ERN
General


Chief Complaint:  Abdominal Pain


Stated Complaint:  ABD PAIN


Time Seen by MD:  04:35


Source:  patient





History of Present Illness


Initial Comments


Patient is a 54-year-old female coming in to be evaluated for abdominal pain.  

Patient states that she has been having abdominal pain for a couple days but 

today got worse some decided to come in further evaluation.  Patient also states

that she has a history of hernias.


Allergies:  


Coded Allergies:  


     acetaminophen (Unverified  Allergy, Unknown, 9/24/23)


     ciprofloxacin (Unverified  Allergy, Unknown, 5/18/21)


     ibuprofen (Unverified  Allergy, Unknown, 5/18/21)


Home Meds


Active Scripts


Cefdinir (Cefdinir) 300 Mg Capsule, 300 MG PO BID for 5 Days, #10 CAP


   Prov:KIERA EVANS NP         2/20/24


Reported Medications


Hydroxyzine HCl (Hydroxyzine HCl) 25 Mg Tablet, 25 MG PO BID, TAB


   2/18/24


Levothyroxine Sodium (Synthroid  50 Mcg Tab) 50 Mcg Tablet, 50 MCG PO DAILY, #1 

TAB


   2/18/24





Past Medical History


Past Medical History:  Anxiety, Diabetes-Type II, High Cholesterol, Heart 

Disease, Hypertension, Hyperthyroid


Medical History Other:  GASTRITIS, HERNIA


Past Surgical History:  Cholecystectomy


Surgical History Other:  CARDIAC STENTS





Family History


Family History:  DM, HTN





Social History


Social History:  Negative, Lives with family





Female(pregnancy History)


Pregnancy History:  Not Applicable





ROS Dictation


CONSTITUTIONAL:  No chills, no fever, no weakness, no diaphoresis, no malaise.


HEAD/FACE:  No signs of trauma. 


EENT:  No eye pain, no blurred vision, no tearing, no double vision, no ear 

pain, no ear discharge, no nose pain, no nasal congestion, no throat pain, no 

throat swelling, no mouth pain. 


RESPIRATORY:  No cough, no orthopnea, no SOB, no stridor, no wheezing. 


CARDIOVASCULAR:  No chest pain, no edema, no palpitations, no syncope. 


GASTROINTESTINAL/ABDOMINAL:    abdominal pain, no constipation, no diarrhea, no 

nausea, no vomiting. 


GENITOURINARY:  No abnormal discharge, no dysuria, no frequent urination, no 

hematuria. No complaints of pain in the genitals. 


MUSCULOSKELETAL:  No back pain, no gout, no joint pain, no joint swelling, no 

muscle pain, no muscle stiffness, no neck pain. 


INTEGUMENTARY:  No change in color, no change in hair/nails, no dryness, no le

kevin, no lumps, no rash. 


NEUROLOGICAL/PSYCH:  No anxiety, not depressed, no emotional problem, no 

headache, no numbness, no pre-existing deficit, no history of seizures,  no 

tremors, no weakness. 


HEMATOLOGIC/LYMPHATIC:  Not anemic, no history of blood clots, no apparent 

bleeding, no bruising, glands not swollen.


All Systems Negative, Except as Noted.





Physical Exam


Physical Exam Dictation


VITAL SIGNS:  Reviewed. 


GENERAL APPEARANCE:  Alert, oriented x3, no acute distress, obese.


HEAD AND FACE: Non-traumatic. 


EYES:   PERRL, pink conjunctivas, eyelid no trauma, anterior chamber clear. 


EARS:  Pinnas intact and no signs of trauma or erythema.  Ear canals clear and 

no discharge. TMs no erythema. 


NOSE:  No discharge, no bleeding. 


OROPHARYNX:  Mouth normal, teeth no caries, tongue pink.  Pharynx clear, no 

erythema.  Tonsils no exudates, no abscesses noted. Mucous membrane moist.


NECK:  Supple, non-tender, no thyromegaly, no masses, no JVD, no bruits. 


BREAST:  Deferred.


CHEST:   No tenderness, no crepitus, no paradoxical movement, no retractions.


LUNGS:  Clear, well-ventilated, symmetric, no rales, no wheezing, no rhonchi, no

stridor, good breath sounds bilaterally. 


HEART:  Regular rate, regular rhythm, no murmur, no gallops. 


VASCULAR: No peripheral edema.


ABDOMEN: Soft, positive bowel sounds, nondistended, no guarding, tender, no 

rebound, no masses no hepatomegaly, no splenomegaly, no Isidro's sign, no 

hernias. 


RECTAL: Deferred. 


GENITAL:  Deferred. 


NEUROLOGICAL:  Normal speech, gross motor function intact, gross sensory 

function intact.


MUSCULOSKELETAL:  Neck nontender, full range of motion, back nontender, full 

range of motion.


EXTREMITIES: Nontender, full range of motion. 


SKIN:  Color pink, dry, no turgor, no rash, no lacerations, no abrasions, no 

contusions.


LYMPHATICS:  Deferred.





Results


Laboratory and Microbiology


Lab and Micro Result





Laboratory Tests








Test


 2/25/25


05:09


 


White Blood Count


 10.5 K/uL


(4.8-10.8)


 


Red Blood Count


 4.35 MIL/uL


(4.00-5.50)


 


Hemoglobin


 12.6 g/dL


(12.0-16.0)


 


Hematocrit


 39.5 % (36-48)





 


Mean Corpuscular Volume


 90.8 fL


(79-99)


 


Mean Corpuscular Hemoglobin


 29.0 pg


(27.0-33.0)


 


Mean Corpuscular Hemoglobin


Concent 31.9 g/dL


(32.0-36.0)  L


 


Red Cell Distribution Width


 13.8 %


(11.0-15.5)


 


Platelet Count


 267 K/uL


(130-400)


 


Mean Platelet Volume


 9.9 fL


(7.5-10.5)


 


Immature Granulocyte % (Auto) 0.2 % (0-1)  


 


Neutrophils (%) (Auto)


 79.2 %


(40.0-77.0)  H


 


Lymphocytes (%) (Auto)


 15.5 %


(21.0-51.0)  L


 


Monocytes (%) (Auto)


 4.1 %


(3.0-13.0)


 


Eosinophils (%) (Auto)


 0.7 %


(0.0-8.0)


 


Basophils (%) (Auto)


 0.3 %


(0.0-5.0)


 


Neutrophils # (Auto)


 8.3 K/uL


(1.8-7.7)  H


 


Lymphocytes # (Auto)


 1.6 K/uL


(1.0-4.8)


 


Monocytes # (Auto)


 0.4 K/uL


(0.1-1.0)


 


Eosinophils # (Auto)


 0.07 K/uL


(0.00-0.70)


 


Basophils # (Auto)


 0.03 K/uL


(0.00-0.20)


 


Absolute Immature Granulocyte


(auto 0.02 K/uL


(0-1)


 


Nucleated Red Blood Cells


 0.0 %


(0.0-0.19)


 


Prothrombin Time


 11.0 SEC


(9.6-11.6)


 


Prothromb Time International


Ratio 1.04


(0.85-1.15)


 


Activated Partial


Thromboplast Time 30.1 SEC


(26.3-35.5)


 


Troponin I High Sensitivity 9 ng/L (4-50)  


 


B-Type Natriuretic Peptide


 10 pg/mL


(0-100)








Labs Reviewed?:  Yes





EKG/XRAY/US/CT/MRI


EKG Comment


02/25/2025 time 4:45 a.m.


Ventricular rate 91


Sinus rhythm





No ST wave elevation or depression





MDM


MDM: 


Differential diagnosis:  Abdominal pain, constipation, hernias,


Patient is a 54-year-old female coming in to be evaluated for abdominal 

discomfort.  Laboratory workup negative for acute findings.  Patient was 

complaining of abdominal pain but states she needs to leave and refused CT.  I 

advised him he needed a CT in his find out why her stomach was hurting so much. 

Patient refused says leave against medical advice.


ED Course





Orders








Procedure Category Date Status





  Time 


 


Cbc With Differential LAB 2/25/25 Complete





  04:37 


 


Prothrombin Time With LAB 2/25/25 Complete





INR  04:37 


 


B-Type Natriuretic LAB 2/25/25 Complete





Peptide  04:37 


 


Chest 1vw RAD 2/25/25 Taken





  04:37 


 


12 Lead Ekg Tracing- EKG 2/25/25 Logged





Technical  04:37 


 


0.9%Nacl 1000ml (Ns PHA 2/25/25 Complete





1000ml)  05:00 


 


Magnesium LAB 2/25/25 In Process





  04:37 


 


Creatine Kinase, Total LAB 2/25/25 In Process





  04:37 


 


Troponin I High LAB 2/25/25 Complete





Sensitivity  04:37 


 


Urinalysis Profile LAB 2/25/25 Logged





  04:37 


 


Partial LAB 2/25/25 Complete





Thromboplastin Time  04:37 


 


Comprehensive LAB 2/25/25 In Process





Metabolic Panel  04:37 


 


Drug Screen Urine LAB 2/25/25 Logged





  04:37 


 


Ct Abdomen/Pelvis W/O CT 2/25/25 Logged





Contrast  06:12 








Current Medications








 Medications


  (Trade)  Dose


 Ordered  Sig/Kayode


 Route


 PRN Reason  Start Time


 Stop Time Status Last Admin


Dose Admin


 


 Sodium Chloride  1,000 ml @ 


 0 mls/hr  ONCE  ONCE


 IV


   2/25/25 05:00


 2/25/25 05:01 DC 2/25/25 05:11











Vital Signs








  Date Time  Temp Pulse Resp B/P (MAP) Pulse Ox O2 Delivery O2 Flow Rate FiO2


 


2/25/25 05:14 98.8 78 18 99/60 100 Room Air* 0 21











DX & DISP


Disposition:  AMA


Departure


Impression:  


   Primary Impression:  Abdominal pain


Condition:  Against Medical Advice





Additional Instructions:  


Patient left against medical advice refused CT I explained to her that he needed

to CT in order to find out why her stomach was hurting since it was generalized.

 She refused and states he was going to follow up with the doctor I advised her 

if she changes her mind she could always come back and we can evaluated 

completely.


Referrals:  


REYES,RAUL MD (PCP)











NIESHA MARIA MD              Feb 25, 2025 06:46

## 2025-02-25 NOTE — HMCIMG
CHEST 1VW



HISTORY: Chest pain



COMPARISON: 10/30/2024



FINDINGS: A frontal projection of the chest was obtained. No acute

pulmonary infiltrates is seen.  The heart is borderline enlarged. 

Prominent interstitial markings are seen.  Degenerative changes are

seen.



IMPRESSION:

1. No acute pulmonary infiltrate is seen.